# Patient Record
(demographics unavailable — no encounter records)

---

## 2025-04-10 NOTE — ASSESSMENT
[FreeTextEntry1] : 45-year-old male presenting for evaluation for GERD dyspepsia the patient in for colon cancer screening.   1.  GERD/dyspepsia: Patient with mid and right upper quadrant abdominal pain.  Some heartburn symptoms as well.  Denies any nausea vomiting no other alarming features or bleeding. - Will plan on awaiting results of H. pylori stool testing offered by primary care doctor - Ultrasound abdomen to evaluate for gallstones as a potential etiology - If above negative will do empiric trial of PPI and consider EGD if no response  2.  Colon cancer screening: Average risk.  Prior colonoscopy 20 years ago for issues with bowel movements - Schedule colonoscopy with Lynne BEAVERS, Desiree Rapp, have explained the bowel prep, clear liquid diet 24 hours prior to procedure, risks, benefits, and alternatives of the procedures.  I have discussed the potential risks including, but not limited to perforation, bleeding, infection, cardiopulmonary complications, aspiration, or unforeseen complications

## 2025-04-10 NOTE — REVIEW OF SYSTEMS
[Abdominal Pain] : abdominal pain [Constipation] : constipation [Heartburn] : heartburn [Negative] : Heme/Lymph [Vomiting] : no vomiting [Diarrhea] : no diarrhea [Melena (black stool)] : no melena [Bleeding] : no bleeding [Fecal Incontinence (soiling)] : no fecal incontinence [Bloating (gassiness)] : no bloating

## 2025-04-10 NOTE — HISTORY OF PRESENT ILLNESS
[FreeTextEntry1] : Jim is a 45 M who is presenting for evaluation of dyspepsia/GERD/bloating and for colon cancer screening. He reports that he has been having intermittent pain on his right side. Worse after eating. Not particularly related to fatty/greasy foods.  No nausea, vomiting, diarrhea, or blood in the stool.  He reports that he has had an endoscopy about 20 years ago and a colonoscopy as well.  His primary care doctor ordered him for an H. pylori stool test but results not back yet.  Did have a positive H. pylori stool test in the past, was treated and eradication confirmed.  He does report some constipation.

## 2025-04-15 NOTE — ASSESSMENT
[FreeTextEntry1] : 1) skin check- -benign findings as above  2) bruising RBC, HgB and platelts reviewed